# Patient Record
Sex: FEMALE | Race: WHITE | NOT HISPANIC OR LATINO | URBAN - NONMETROPOLITAN AREA
[De-identification: names, ages, dates, MRNs, and addresses within clinical notes are randomized per-mention and may not be internally consistent; named-entity substitution may affect disease eponyms.]

---

## 2019-01-15 ENCOUNTER — HISTORICAL (OUTPATIENT)
Dept: ADMINISTRATIVE | Facility: HOSPITAL | Age: 29
End: 2019-01-15

## 2023-08-08 ENCOUNTER — TELEPHONE (OUTPATIENT)
Dept: UROLOGY | Facility: CLINIC | Age: 33
End: 2023-08-08

## 2023-08-08 NOTE — TELEPHONE ENCOUNTER
Contacted pt, c/o hematuria w/ flank pain. Pt has history of kidney stones, but has never had hematuria nor procedure. All stones have been passable. Suggested that pt go have imaging done so that EHR can review prior to being seen. PT verbalized understanding. BJP    ----- Message from Elle Iyer sent at 8/8/2023  4:39 PM CDT -----  Contact: self  Pt is calling in regards to an appt for blood in her urine . Please call pt at [phone] . Pt has [email or web address] insurance but didn't verify on my end.